# Patient Record
Sex: FEMALE | Race: WHITE | NOT HISPANIC OR LATINO | Employment: PART TIME | URBAN - METROPOLITAN AREA
[De-identification: names, ages, dates, MRNs, and addresses within clinical notes are randomized per-mention and may not be internally consistent; named-entity substitution may affect disease eponyms.]

---

## 2017-02-20 ENCOUNTER — OFFICE VISIT (OUTPATIENT)
Dept: INTERNAL MEDICINE | Facility: CLINIC | Age: 42
End: 2017-02-20

## 2017-02-20 ENCOUNTER — HOSPITAL ENCOUNTER (OUTPATIENT)
Dept: GENERAL RADIOLOGY | Facility: HOSPITAL | Age: 42
Discharge: HOME | End: 2017-02-20
Admitting: NURSE PRACTITIONER

## 2017-02-20 ENCOUNTER — HOSPITAL ENCOUNTER (OUTPATIENT)
Dept: GENERAL RADIOLOGY | Facility: HOSPITAL | Age: 42
Discharge: HOME | End: 2017-02-20

## 2017-02-20 VITALS
BODY MASS INDEX: 23.82 KG/M2 | OXYGEN SATURATION: 99 % | SYSTOLIC BLOOD PRESSURE: 100 MMHG | HEIGHT: 68 IN | WEIGHT: 157.19 LBS | HEART RATE: 67 BPM | DIASTOLIC BLOOD PRESSURE: 58 MMHG

## 2017-02-20 DIAGNOSIS — M25.561 ARTHRALGIA OF BOTH KNEES: ICD-10-CM

## 2017-02-20 DIAGNOSIS — M25.542 ARTHRALGIA OF BOTH HANDS: Primary | ICD-10-CM

## 2017-02-20 DIAGNOSIS — M25.541 ARTHRALGIA OF BOTH HANDS: Primary | ICD-10-CM

## 2017-02-20 DIAGNOSIS — M25.562 ARTHRALGIA OF BOTH KNEES: ICD-10-CM

## 2017-02-20 PROCEDURE — 73130 X-RAY EXAM OF HAND: CPT

## 2017-02-20 PROCEDURE — 73562 X-RAY EXAM OF KNEE 3: CPT

## 2017-02-20 PROCEDURE — 99203 OFFICE O/P NEW LOW 30 MIN: CPT | Performed by: NURSE PRACTITIONER

## 2017-02-20 RX ORDER — MELOXICAM 15 MG/1
15 TABLET ORAL DAILY
Qty: 30 TABLET | Refills: 1 | Status: SHIPPED | OUTPATIENT
Start: 2017-02-20

## 2017-02-20 RX ORDER — BUPROPION HYDROCHLORIDE 150 MG/1
1 TABLET, EXTENDED RELEASE ORAL DAILY
COMMUNITY
Start: 2016-12-19

## 2017-02-20 NOTE — PROGRESS NOTES
Subjective   Francoise JARAMILLO is a 41 y.o. female. Patient is here today for   Chief Complaint   Patient presents with   • Generalized Body Aches     Pt complains of having pain in bilateral hands & knees x2 years. Pt has not tried taking any OTC meds to help with these joint pains.    .    History of Present Illness   New patient here to establish care.  Health history and questionnaire have been reviewed in its entirety. The patient's previous primary care provider was in [place].  C/o bilateral hand pain, bilateral knee pain and occasional pain in her jaw off and on x 2 years. Her hand joints are swollen at times to the point that she cannot fit her ring on her finger.   She hasn't taken any medication or tried any treatments for her pain/discomfort.    The following portions of the patient's history were reviewed and updated as appropriate: allergies, current medications, past family history, past medical history, past social history, past surgical history and problem list.    Review of Systems   Constitutional: Negative.    Respiratory: Negative.    Cardiovascular: Negative.    Musculoskeletal: Positive for arthralgias.       Objective   Vitals:    02/20/17 0908   BP: 100/58   Pulse: 67   SpO2: 99%     Physical Exam   Constitutional: Vital signs are normal. She appears well-developed and well-nourished.   Cardiovascular: Normal rate, regular rhythm and normal heart sounds.    Pulses:       Radial pulses are 2+ on the right side, and 2+ on the left side.        Posterior tibial pulses are 2+ on the right side, and 2+ on the left side.   Pulmonary/Chest: Effort normal and breath sounds normal.   Musculoskeletal:        Right knee: She exhibits normal range of motion and no swelling.        Left knee: She exhibits normal range of motion and no swelling.        Right hand: She exhibits normal range of motion.        Left hand: She exhibits normal range of motion.   Bilateral hands with swollen PIP joints  No  swelling/edema noted in bilateral knees   Skin: Skin is warm and dry.   Nursing note and vitals reviewed.      Assessment/Plan   Francoise was seen today for generalized body aches.    Diagnoses and all orders for this visit:    Arthralgia of both hands  -     CBC & Differential  -     Comprehensive Metabolic Panel  -     TSH  -     Rheumatoid Factor, Quant  -     [facility] Comprehensive Panel  -     C-reactive protein  -     CK  -     Sedimentation rate  -     meloxicam (MOBIC) 15 MG tablet; Take 1 tablet by mouth Daily.  -     XR hand 3+ vw bilateral    Arthralgia of both knees  -     CBC & Differential  -     Comprehensive Metabolic Panel  -     TSH  -     Rheumatoid Factor, Quant  -     [facility] Comprehensive Panel  -     C-reactive protein  -     CK  -     Sedimentation rate  -     meloxicam (MOBIC) 15 MG tablet; Take 1 tablet by mouth Daily.  -     XR knee 3 vw bilateral      Patient will be started on Mobic. F/u based on labs and x-rays.

## 2017-02-21 ENCOUNTER — TELEPHONE (OUTPATIENT)
Dept: INTERNAL MEDICINE | Facility: CLINIC | Age: 42
End: 2017-02-21

## 2017-02-21 DIAGNOSIS — M25.562 PAIN IN BOTH KNEES, UNSPECIFIED CHRONICITY: Primary | ICD-10-CM

## 2017-02-21 DIAGNOSIS — M25.561 PAIN IN BOTH KNEES, UNSPECIFIED CHRONICITY: Primary | ICD-10-CM

## 2017-02-21 LAB
ALBUMIN SERPL-MCNC: 4.7 G/DL (ref 3.5–5.2)
ALBUMIN/GLOB SERPL: 2.6 G/DL
ALP SERPL-CCNC: 41 U/L (ref 39–117)
ALT SERPL-CCNC: 15 U/L (ref 1–33)
AST SERPL-CCNC: 15 U/L (ref 1–32)
BASOPHILS # BLD AUTO: 0.01 10*3/MM3 (ref 0–0.2)
BASOPHILS NFR BLD AUTO: 0.2 % (ref 0–1.5)
BILIRUB SERPL-MCNC: 0.6 MG/DL (ref 0.1–1.2)
BUN SERPL-MCNC: 9 MG/DL (ref 6–20)
BUN/CREAT SERPL: 10.6 (ref 7–25)
CALCIUM SERPL-MCNC: 9.6 MG/DL (ref 8.6–10.5)
CENTROMERE B AB SER-ACNC: <0.2 AI (ref 0–0.9)
CHLORIDE SERPL-SCNC: 102 MMOL/L (ref 98–107)
CHROMATIN AB SERPL-ACNC: <0.2 AI (ref 0–0.9)
CK SERPL-CCNC: 126 U/L (ref 20–180)
CO2 SERPL-SCNC: 29.1 MMOL/L (ref 22–29)
CREAT SERPL-MCNC: 0.85 MG/DL (ref 0.57–1)
CRP SERPL-MCNC: <0.03 MG/DL (ref 0–0.5)
DSDNA AB SER-ACNC: <1 IU/ML (ref 0–9)
ENA JO1 AB SER-ACNC: <0.2 AI (ref 0–0.9)
ENA RNP AB SER-ACNC: <0.2 AI (ref 0–0.9)
ENA SCL70 AB SER-ACNC: <0.2 AI (ref 0–0.9)
ENA SM AB SER-ACNC: <0.2 AI (ref 0–0.9)
ENA SS-A AB SER-ACNC: <0.2 AI (ref 0–0.9)
ENA SS-B AB SER-ACNC: <0.2 AI (ref 0–0.9)
EOSINOPHIL # BLD AUTO: 0.06 10*3/MM3 (ref 0–0.7)
EOSINOPHIL NFR BLD AUTO: 1.3 % (ref 0.3–6.2)
ERYTHROCYTE [DISTWIDTH] IN BLOOD BY AUTOMATED COUNT: 12.7 % (ref 11.7–13)
ERYTHROCYTE [SEDIMENTATION RATE] IN BLOOD BY WESTERGREN METHOD: 2 MM/HR (ref 0–20)
GLOBULIN SER CALC-MCNC: 1.8 GM/DL
GLUCOSE SERPL-MCNC: 56 MG/DL (ref 65–99)
HCT VFR BLD AUTO: 40.4 % (ref 35.6–45.5)
HGB BLD-MCNC: 13.1 G/DL (ref 11.9–15.5)
IMM GRANULOCYTES # BLD: 0 10*3/MM3 (ref 0–0.03)
IMM GRANULOCYTES NFR BLD: 0 % (ref 0–0.5)
LYMPHOCYTES # BLD AUTO: 1.2 10*3/MM3 (ref 0.9–4.8)
LYMPHOCYTES NFR BLD AUTO: 25.3 % (ref 19.6–45.3)
Lab: NORMAL
MCH RBC QN AUTO: 29.3 PG (ref 26.9–32)
MCHC RBC AUTO-ENTMCNC: 32.4 G/DL (ref 32.4–36.3)
MCV RBC AUTO: 90.4 FL (ref 80.5–98.2)
MONOCYTES # BLD AUTO: 0.29 10*3/MM3 (ref 0.2–1.2)
MONOCYTES NFR BLD AUTO: 6.1 % (ref 5–12)
NEUTROPHILS # BLD AUTO: 3.18 10*3/MM3 (ref 1.9–8.1)
NEUTROPHILS NFR BLD AUTO: 67.1 % (ref 42.7–76)
PLATELET # BLD AUTO: 186 10*3/MM3 (ref 140–500)
POTASSIUM SERPL-SCNC: 4.5 MMOL/L (ref 3.5–5.2)
PROT SERPL-MCNC: 6.5 G/DL (ref 6–8.5)
RBC # BLD AUTO: 4.47 10*6/MM3 (ref 3.9–5.2)
RHEUMATOID FACT SERPL-ACNC: <10 IU/ML (ref 0–13.9)
SODIUM SERPL-SCNC: 143 MMOL/L (ref 136–145)
TSH SERPL DL<=0.005 MIU/L-ACNC: 2.86 MIU/ML (ref 0.27–4.2)
WBC # BLD AUTO: 4.74 10*3/MM3 (ref 4.5–10.7)

## 2017-02-21 NOTE — TELEPHONE ENCOUNTER
----- Message from DANNIELLE Burr sent at 2/21/2017  3:09 PM EST -----  Please let patient know that her labs are all normal. I can refer her to ortho for her knee pain if she wants.

## 2017-02-28 ENCOUNTER — OFFICE VISIT (OUTPATIENT)
Dept: ORTHOPEDIC SURGERY | Facility: CLINIC | Age: 42
End: 2017-02-28

## 2017-02-28 VITALS — BODY MASS INDEX: 23.79 KG/M2 | WEIGHT: 157 LBS | HEIGHT: 68 IN

## 2017-02-28 DIAGNOSIS — M25.561 ACUTE PAIN OF BOTH KNEES: Primary | ICD-10-CM

## 2017-02-28 DIAGNOSIS — M25.562 ACUTE PAIN OF BOTH KNEES: Primary | ICD-10-CM

## 2017-02-28 PROCEDURE — 99203 OFFICE O/P NEW LOW 30 MIN: CPT | Performed by: NURSE PRACTITIONER

## 2017-02-28 NOTE — PROGRESS NOTES
"Patient: Francoise JARAMILLO  YOB: 1975 41 y.o. female  Medical Record Number: [id number]    Chief Complaints:   Chief Complaint   Patient presents with   • Right Knee - Establish Care   • Left Knee - Establish Care       History of Present Illness:Francoise JARAMILLO is a 41 y.o. female who presents with complaints of bilateral knee pain.  Patient reports she has general achiness of both knees that started about 3-4 months ago.  Patient denies any injury.  She describes the knee pain as a very mild\" ache\" left is worse than the right.  She had a previous right ACL reconstruction about 20 years ago but has not really had any issues.  She denies any mechanical symptoms.  Her primary care physician gave her prescription for meloxicam which is still sitting at home because\" it is not that bad\"    Allergies:   Allergies   Allergen Reactions   • Guaifenesin & Derivatives        Medications:   Current Outpatient Prescriptions   Medication Sig Dispense Refill   • buPROPion SR (WELLBUTRIN SR) 150 MG 12 hr tablet Take 1 tablet by mouth Daily.     • sertraline (ZOLOFT) 50 MG tablet Take 1 tablet by mouth Daily.  5   • meloxicam (MOBIC) 15 MG tablet Take 1 tablet by mouth Daily. 30 tablet 1     No current facility-administered medications for this visit.          The following portions of the patient's history were reviewed and updated as appropriate: allergies, current medications, past family history, past medical history, past social history, past surgical history and problem list.    Review of Systems:   A 14 point review of systems was performed. All systems negative except pertinent positives/negative listed in HPI above    Physical Exam:   Vitals:    02/28/17 1423   Weight: 157 lb (71.2 kg)   Height: 68\" (172.7 cm)       General: A and O x 3, ASA, NAD    SCLERA:    Normal    DENTITION:   Normal skin clear no unusual shins noted  Bilateral knees patient has great range of motion she does have some mild " patellofemoral crepitus noted otherwise normal flexion and extension with a negative Lockman negative Cat calf is soft nontender    Assessment/Plan:  Mild osteoarthritis bilateral knees    I did review the patient's previous x-rays which show some mild arthritic changes.  Patient refusing any further intervention at this time.  She has a prescription for meloxicam and home to take as needed.  We discussed possibility of physical therapy.  Patient declines.  Otherwise she will come to the office as needed